# Patient Record
Sex: MALE | Race: BLACK OR AFRICAN AMERICAN | Employment: FULL TIME | URBAN - NONMETROPOLITAN AREA
[De-identification: names, ages, dates, MRNs, and addresses within clinical notes are randomized per-mention and may not be internally consistent; named-entity substitution may affect disease eponyms.]

---

## 2018-04-18 ENCOUNTER — OFFICE VISIT (OUTPATIENT)
Dept: PRIMARY CARE CLINIC | Age: 28
End: 2018-04-18

## 2018-04-18 VITALS
WEIGHT: 315 LBS | BODY MASS INDEX: 42.66 KG/M2 | DIASTOLIC BLOOD PRESSURE: 96 MMHG | TEMPERATURE: 96 F | OXYGEN SATURATION: 96 % | HEIGHT: 72 IN | HEART RATE: 92 BPM | SYSTOLIC BLOOD PRESSURE: 124 MMHG

## 2018-04-18 DIAGNOSIS — H10.33 ACUTE BACTERIAL CONJUNCTIVITIS OF BOTH EYES: Primary | ICD-10-CM

## 2018-04-18 PROCEDURE — 99213 OFFICE O/P EST LOW 20 MIN: CPT | Performed by: PHYSICIAN ASSISTANT

## 2018-04-18 RX ORDER — SULFACETAMIDE SODIUM 100 MG/ML
2 SOLUTION/ DROPS OPHTHALMIC 4 TIMES DAILY
Qty: 1 BOTTLE | Refills: 0 | Status: SHIPPED | OUTPATIENT
Start: 2018-04-18 | End: 2018-04-28

## 2018-04-18 ASSESSMENT — ENCOUNTER SYMPTOMS
EYE DISCHARGE: 1
FOREIGN BODY SENSATION: 0
DOUBLE VISION: 0
EYE REDNESS: 1
RESPIRATORY NEGATIVE: 1
BLURRED VISION: 1

## 2018-04-18 ASSESSMENT — PATIENT HEALTH QUESTIONNAIRE - PHQ9
SUM OF ALL RESPONSES TO PHQ9 QUESTIONS 1 & 2: 0
1. LITTLE INTEREST OR PLEASURE IN DOING THINGS: 0
2. FEELING DOWN, DEPRESSED OR HOPELESS: 0
SUM OF ALL RESPONSES TO PHQ QUESTIONS 1-9: 0

## 2018-08-01 ENCOUNTER — OFFICE VISIT (OUTPATIENT)
Dept: PRIMARY CARE CLINIC | Age: 28
End: 2018-08-01

## 2018-08-01 VITALS
OXYGEN SATURATION: 98 % | HEIGHT: 72 IN | HEART RATE: 70 BPM | DIASTOLIC BLOOD PRESSURE: 72 MMHG | WEIGHT: 315 LBS | TEMPERATURE: 97.8 F | BODY MASS INDEX: 42.66 KG/M2 | SYSTOLIC BLOOD PRESSURE: 132 MMHG
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
## 2018-08-01 PROCEDURE — 99213 OFFICE O/P EST LOW 20 MIN: CPT | Performed by: PHYSICIAN ASSISTANT

## 2018-08-01 PROCEDURE — 90715 TDAP VACCINE 7 YRS/> IM: CPT | Performed by: PHYSICIAN ASSISTANT

## 2018-08-01 PROCEDURE — 90471 IMMUNIZATION ADMIN: CPT | Performed by: PHYSICIAN ASSISTANT

## 2018-08-01 RX ORDER — AMOXICILLIN AND CLAVULANATE POTASSIUM 875; 125 MG/1; MG/1
1 TABLET, FILM COATED ORAL 2 TIMES DAILY
Qty: 20 TABLET | Refills: 0 | Status: SHIPPED | OUTPATIENT
Start: 2018-08-01 | End: 2018-08-11

## 2018-08-01 RX ORDER — NAPROXEN SODIUM 550 MG/1
550 TABLET ORAL 2 TIMES DAILY WITH MEALS
Qty: 30 TABLET | Refills: 1 | Status: SHIPPED | OUTPATIENT
Start: 2018-08-01 | End: 2021-11-02 | Stop reason: ALTCHOICE

## 2018-08-01 NOTE — PROGRESS NOTES
Subjective:      Patient ID: Camacho Pryor is a 33 y.o. male. Patient seen due to a dog bite on left thumb this happened 7/26/18 his dog bit him. It was storming he went to get his dog in a room and the dog bit him. He has never bit anyone before. The thumb is throbbing and hurting. Has a lot of pain and swelling. No fever chills. Needs a tetanus. Has taken tylenol only. Did put peroxide on area twice. Review of Systems   Constitutional: Positive for activity change. Negative for appetite change, chills, fatigue and fever. HENT: Negative. Respiratory: Negative. Cardiovascular: Negative. Gastrointestinal: Negative. Genitourinary: Negative. Musculoskeletal: Positive for arthralgias and joint swelling. It hurts to use left hand. Is right handed. Neurological: Positive for weakness. Negative for tremors and numbness. Psychiatric/Behavioral: Positive for sleep disturbance. He sleeps on arm anyways and this awakens him. Past Medical History:   Diagnosis Date    Tobacco abuse      Past Surgical History:   Procedure Laterality Date    TONSILLECTOMY AND ADENOIDECTOMY      at age 7     Social History     Social History    Marital status: Single     Spouse name: N/A    Number of children: N/A    Years of education: N/A     Occupational History    Not on file. Social History Main Topics    Smoking status: Current Some Day Smoker     Packs/day: 0.50    Smokeless tobacco: Never Used    Alcohol use Not on file    Drug use: No    Sexual activity: Not on file     Other Topics Concern    Not on file     Social History Narrative    No narrative on file     History reviewed. No pertinent family history. No Known Allergies    /72   Pulse 70   Temp 97.8 °F (36.6 °C)   Ht 6' (1.829 m)   Wt (!) 335 lb (152 kg)   SpO2 98%   BMI 45.43 kg/m²       Objective:   Physical Exam   Constitutional: He appears well-developed and well-nourished. No distress. HENT:   Head: Normocephalic and atraumatic. Nose: Nose normal.   Eyes: Conjunctivae are normal. No scleral icterus. Cardiovascular: Intact distal pulses. Pulmonary/Chest: Effort normal.   Skin: Skin is warm. No rash noted. There is erythema. Puncture wound noted on the medial left distal from and screen for slight puncture wound noted on the palmar aspect of the tip of the thumb. Moderate swelling noted in the left palm. Slight erythema around the nail bed in general noted. He has subungual hematoma as well some crusting and bleeding at the nail edge noted. He had a lot of pain with palpation over the distal phalanx. Decreased range of motion secondary to swelling and pain. Sensation normal.   Psychiatric: He has a normal mood and affect. Nursing note and vitals reviewed. Assessment:         Diagnosis Orders   1.  Dog bite, initial encounter  amoxicillin-clavulanate (AUGMENTIN) 875-125 MG per tablet    naproxen sodium (ANAPROX) 550 MG tablet    Tdap (age 6y and older) IM (BOOSTRIX)           Plan:      Recommending soaks for the thumb  Follow-up not improving or worsens  Tetanus updated  Answered all of his questions  Form for health department filled out explained patient we had to do this he will was worried he may lose his dog they informed most likely not the case

## 2018-08-01 NOTE — PATIENT INSTRUCTIONS
license by [facility] ([facility]). If you have questions about a medical condition or this instruction, always ask your healthcare professional. [place] [address] any warranty or liability for your use of this information.

## 2018-08-02 ASSESSMENT — ENCOUNTER SYMPTOMS
GASTROINTESTINAL NEGATIVE: 1
RESPIRATORY NEGATIVE: 1

## 2021-11-02 ENCOUNTER — OFFICE VISIT (OUTPATIENT)
Dept: PRIMARY CARE CLINIC | Age: 31
End: 2021-11-02
Payer: COMMERCIAL

## 2021-11-02 VITALS
TEMPERATURE: 97.5 F | OXYGEN SATURATION: 98 % | SYSTOLIC BLOOD PRESSURE: 200 MMHG | DIASTOLIC BLOOD PRESSURE: 100 MMHG | HEART RATE: 69 BPM | BODY MASS INDEX: 41.45 KG/M2 | RESPIRATION RATE: 20 BRPM | WEIGHT: 306 LBS | HEIGHT: 72 IN

## 2021-11-02 DIAGNOSIS — H10.502 BLEPHAROCONJUNCTIVITIS OF LEFT EYE, UNSPECIFIED BLEPHAROCONJUNCTIVITIS TYPE: Primary | ICD-10-CM

## 2021-11-02 DIAGNOSIS — R03.0 ELEVATED BLOOD PRESSURE READING: ICD-10-CM

## 2021-11-02 PROBLEM — E66.01 SEVERE OBESITY (BMI >= 40) (HCC): Status: ACTIVE | Noted: 2021-11-02

## 2021-11-02 PROBLEM — I10 HYPERTENSION: Status: ACTIVE | Noted: 2021-11-02

## 2021-11-02 PROCEDURE — 99204 OFFICE O/P NEW MOD 45 MIN: CPT | Performed by: FAMILY MEDICINE

## 2021-11-02 RX ORDER — MOXIFLOXACIN 5 MG/ML
1 SOLUTION/ DROPS OPHTHALMIC 3 TIMES DAILY
Qty: 1 EACH | Refills: 0 | Status: SHIPPED | OUTPATIENT
Start: 2021-11-02 | End: 2021-11-09

## 2021-11-02 RX ORDER — LISINOPRIL AND HYDROCHLOROTHIAZIDE 12.5; 1 MG/1; MG/1
1 TABLET ORAL DAILY
COMMUNITY
Start: 2021-10-12

## 2021-11-02 SDOH — ECONOMIC STABILITY: FOOD INSECURITY: WITHIN THE PAST 12 MONTHS, YOU WORRIED THAT YOUR FOOD WOULD RUN OUT BEFORE YOU GOT MONEY TO BUY MORE.: NEVER TRUE

## 2021-11-02 SDOH — ECONOMIC STABILITY: FOOD INSECURITY: WITHIN THE PAST 12 MONTHS, THE FOOD YOU BOUGHT JUST DIDN'T LAST AND YOU DIDN'T HAVE MONEY TO GET MORE.: NEVER TRUE

## 2021-11-02 ASSESSMENT — PATIENT HEALTH QUESTIONNAIRE - PHQ9
SUM OF ALL RESPONSES TO PHQ QUESTIONS 1-9: 0
1. LITTLE INTEREST OR PLEASURE IN DOING THINGS: 0
2. FEELING DOWN, DEPRESSED OR HOPELESS: 0
SUM OF ALL RESPONSES TO PHQ9 QUESTIONS 1 & 2: 0
SUM OF ALL RESPONSES TO PHQ QUESTIONS 1-9: 0
SUM OF ALL RESPONSES TO PHQ QUESTIONS 1-9: 0

## 2021-11-02 ASSESSMENT — SOCIAL DETERMINANTS OF HEALTH (SDOH): HOW HARD IS IT FOR YOU TO PAY FOR THE VERY BASICS LIKE FOOD, HOUSING, MEDICAL CARE, AND HEATING?: NOT HARD AT ALL

## 2021-11-02 NOTE — PROGRESS NOTES
[facility] ([place], [place]) Urgent Care             [address] [place] [address] [place], [address]                        Telephone [phone]             Fax [phone]    Assessment and Plan:    1. Blepharoconjunctivitis of left eye, unspecified blepharoconjunctivitis type  - moxifloxacin (VIGAMOX) 0.5 % ophthalmic solution; Place 1 drop into the left eye 3 times daily for 7 days  Dispense: 1 each; Refill: 0    Printed information regarding Pinkeye was provided to the patient with the after visit summary. 2. Elevated blood pressure reading  His blood pressure is elevated today. (BP: (!) 200/100)  He reports that he was recently started on blood pressure medication, but he is not always consistent about taking his blood pressure medication. He is taking Lisinopril-Hydrochlorothiazide 10-12. 5. He was advised to take an additional dose today. He was advised to monitor his blood pressure outside of the office, and to bring the readings to his appointment his PCP. Printed information regarding Elevated Blood Pressure was provided to the patient with his after visit summary. Subjective:    Jennifer Mccann is a 28 y.o. male who presents to [facility] ([place], [place]) Urgent Care today (11/2/2021) for evaluation of:  Facial Swelling (left eye swelling started yesterday )    He states that he was working with [employer] yesterday, and he developed pain in the left eye. He has pain in the left eye today. The left eye was swollen today. He has had some watery drainage from the eye. He has a history of hypertension. He states that he is not consistent about taking his blood pressure medication.       He has the following problem list:  Patient Active Problem List   Diagnosis    Tobacco abuse    Hypertension        Current medications are:  Current Outpatient Medications   Medication Sig Dispense Refill    lisinopril-hydroCHLOROthiazide (PRINZIDE;ZESTORETIC) 10-12.5 MG per

## 2021-11-02 NOTE — PATIENT INSTRUCTIONS
Patient Education        Pinkeye: Care Instructions  Your Care Instructions     Pinkeye is redness and swelling of the eye surface and the conjunctiva (the lining of the eyelid and the covering of the white part of the eye). Pinkeye is also called conjunctivitis. Pinkeye is often caused by infection with bacteria or a virus. Dry air, allergies, smoke, and chemicals are other common causes. Pinkeye often clears on its own in 7 to 10 days. Antibiotics only help if the pinkeye is caused by bacteria. Pinkeye caused by infection spreads easily. If an allergy or chemical is causing pinkeye, it will not go away unless you can avoid whatever is causing it. Follow-up care is a key part of your treatment and safety. Be sure to make and go to all appointments, and call your doctor if you are having problems. It's also a good idea to know your test results and keep a list of the medicines you take. How can you care for yourself at home? · Wash your hands often. Always wash them before and after you treat pinkeye or touch your eyes or face. · Use moist cotton or a clean, wet cloth to remove crust. Wipe from the inside corner of the eye to the outside. Use a clean part of the cloth for each wipe. · Put cold or warm wet cloths on your eye a few times a day if the eye hurts. · Do not wear contact lenses or eye makeup until the pinkeye is gone. Throw away any eye makeup you were using when you got pinkeye. Clean your contacts and storage case. If you wear disposable contacts, use a new pair when your eye has cleared and it is safe to wear contacts again. · If the doctor gave you antibiotic ointment or eyedrops, use them as directed. Use the medicine for as long as instructed, even if your eye starts looking better soon. Keep the bottle tip clean, and do not let it touch the eye area. · To put in eyedrops or ointment:  ? Tilt your head back, and pull your lower eyelid down with one finger. ?  Drop or squirt the medicine inside the lower lid. ? Close your eye for 30 to 60 seconds to let the drops or ointment move around. ? Do not touch the ointment or dropper tip to your eyelashes or any other surface. · Do not share towels, pillows, or washcloths while you have pinkeye. When should you call for help? Call your doctor now or seek immediate medical care if:    · You have pain in your eye, not just irritation on the surface.     · You have a change in vision or loss of vision.     · You have an increase in discharge from the eye.     · Your eye has not started to improve or begins to get worse within 48 hours after you start using antibiotics.     · Pinkeye lasts longer than 7 days. Watch closely for changes in your health, and be sure to contact your doctor if you have any problems. Where can you learn more? Go to https://WallCompasspeThe Daily Hundredeb.Paixie.net. org and sign in to your [employer] account. Enter Y392 in the [employer] box to learn more about \"Pinkeye: Care Instructions. \"     If you do not have an account, please click on the \"Sign Up Now\" link. Current as of: July 1, 2021               Content Version: 13.0  © 2006-2021 Healthwise, Incorporated. Care instructions adapted under license by [facility] ([facility]). If you have questions about a medical condition or this instruction, always ask your healthcare professional. [place] [address] any warranty or liability for your use of this information. Patient Education        Elevated Blood Pressure: Care Instructions  Your Care Instructions    Blood pressure is a measure of how hard the blood pushes against the walls of your arteries. It's normal for blood pressure to go up and down throughout the day. But if it stays up over time, you have high blood pressure. Two numbers tell you your blood pressure. The first number is the systolic pressure. It shows how hard the blood pushes when your heart is pumping.  The second number is the diastolic pressure. It shows how hard the blood pushes between heartbeats, when your heart is relaxed and filling with blood. An ideal blood pressure in adults is less than 120/80 (say \"120 over 80\"). High blood pressure is 140/90 or higher. You have high blood pressure if your top number is 140 or higher or your bottom number is 90 or higher, or both. The main test for high blood pressure is simple, fast, and painless. To diagnose high blood pressure, your doctor will test your blood pressure at different times. After testing your blood pressure, your doctor may ask you to test it again when you are home. If you are diagnosed with high blood pressure, you can work with your doctor to make a long-term plan to manage it. Follow-up care is a key part of your treatment and safety. Be sure to make and go to all appointments, and call your doctor if you are having problems. It's also a good idea to know your test results and keep a list of the medicines you take. How can you care for yourself at home? · Do not smoke. Smoking increases your risk for heart attack and stroke. If you need help quitting, talk to your doctor about stop-smoking programs and medicines. These can increase your chances of quitting for good. · Stay at a healthy weight. · Try to limit how much sodium you eat to less than 2,300 milligrams (mg) a day. Your doctor may ask you to try to eat less than 1,500 mg a day. · Be physically active. Get at least 30 minutes of exercise on most days of the week. Walking is a good choice. You also may want to do other activities, such as running, swimming, cycling, or playing tennis or team sports. · Avoid or limit alcohol. Talk to your doctor about whether you can drink any alcohol. · Eat plenty of fruits, vegetables, and low-fat dairy products. Eat less saturated and total fats. · Learn how to check your blood pressure at home. When should you call for help?   Call your doctor now or seek immediate medical care if:    · Your blood pressure is much higher than normal (such as 180/110 or higher).     · You think high blood pressure is causing symptoms such as:  ¨ Severe headache. ¨ Blurry vision.    Watch closely for changes in your health, and be sure to contact your doctor if:    · You do not get better as expected. Where can you learn more? Go to https://Bluepaypepiceweb.[employer]. org and sign in to your [employer] account. Enter B424 in the [employer] box to learn more about \"Elevated Blood Pressure: Care Instructions. \"     If you do not have an account, please click on the \"Sign Up Now\" link. Current as of: May 10, 2017  Content Version: 11.6  © 8883-6060 [employer]. Care instructions adapted under license by [facility] ([facility]). If you have questions about a medical condition or this instruction, always ask your healthcare professional. [address] any warranty or liability for your use of this information. Take an additional dose of your blood pressure medication today. Monitor your blood pressure outside of the doctor's office, if possible. Write down the blood pressure readings and bring them to the appointment with your doctor.

## 2021-11-02 NOTE — LETTER
[address] [facility] A department of [place] [address]  Phone: [phone]  Fax: [phone]        Araceli Zamora MD      November 2, 2021    Patient:   Armani Flatness  Date of Birth   1990  Date of visit   11/2/2021        To Whom it May Concern:      Chicho Gaviria was seen in my clinic on 11/2/2021. Please excuse from work 11/02/2021. If you have any questions or concerns, please don't hesitate to call.       Sincerely,      Araceli Zamora MD / Larisa Lopez

## 2021-11-02 NOTE — PROGRESS NOTES
Patient states that he is a  and yesterday at work got drywall compound in his left eye. He immediately flushed his eyes out but the eye has been swollen since.